# Patient Record
Sex: MALE | Race: WHITE | Employment: FULL TIME | URBAN - METROPOLITAN AREA
[De-identification: names, ages, dates, MRNs, and addresses within clinical notes are randomized per-mention and may not be internally consistent; named-entity substitution may affect disease eponyms.]

---

## 2020-07-08 ENCOUNTER — OFFICE VISIT (OUTPATIENT)
Dept: PRIMARY CARE CLINIC | Age: 35
End: 2020-07-08

## 2020-07-08 ENCOUNTER — HOSPITAL ENCOUNTER (OUTPATIENT)
Age: 35
Setting detail: SPECIMEN
Discharge: HOME | End: 2020-07-08
Payer: COMMERCIAL

## 2020-07-08 VITALS — OXYGEN SATURATION: 96 % | TEMPERATURE: 98.1 F | HEART RATE: 119 BPM

## 2020-07-08 PROCEDURE — U0002 COVID-19 LAB TEST NON-CDC: HCPCS

## 2020-07-08 PROCEDURE — 99213 OFFICE O/P EST LOW 20 MIN: CPT | Performed by: FAMILY MEDICINE

## 2020-07-08 NOTE — PATIENT INSTRUCTIONS
Your COVID 19 will take 3-4 days for the results come back. The [address] will notify you if your test result are POSITIVE. We ask that you make a Mychart page and view your test results. The office will notify if results are Negative. You will need to Self quarantine until you know your results  Increase fluids rest  Saline nasal spray as directed  Warm salt gargles for throat discomfort  Monitor temperature twice a day  Tylenol for fevers and/or discomfort. If symptoms are worse -Go to the ER. Follow up with your primary doctor    To Whom it May Concern:    Serene Orr has been tested for COVID. They may not return to work until their lab test results back and they been fever free for 3 days.   If test is positive they must stay home for 2 weeks or until they test negative

## 2020-07-08 NOTE — PROGRESS NOTES
7/8/20  Chau Meek  1985    FLU/COVID-19 CLINIC EVALUATION    HPI SYMPTOMS: 2 days of cough with chest congestion, nasal congestion, ST and loss of taste. No GI issues. No known exposures. Getting a little worse today. Employer: [employer]    [] Fevers  [] Chills  [x] Cough  [] Coughing up blood  [x] Chest Congestion  [x] Nasal Congestion  [] Feeling short of breath  [] Sometimes  [] Frequently  [] All the time  [] Chest pain  [] Headaches  []Tolerable  [] Severe  [x] Sore throat  [] Muscle aches  [] Nausea  [] Vomiting  []Unable to keep fluids down  [] Diarrhea  []Severe    [x] OTHER SYMPTOMS: Loss of Taste      Symptom Duration:   [] 1  [x] 2   [] 3   [] 4    [] 5   [] 6   [] 7   [] 8   [] 9   [] 10   [] 11   [] 12   [] 13   [] 14   [] Longer than 14 days    Symptom course:   [] Worsening     [] Stable     [] Improving    RISK FACTORS:    [] Pregnant or possibly pregnant  [] Age over 61  [] Diabetes  [] Heart disease  [] Asthma  [] COPD/Other chronic lung diseases  [] Active Cancer  [] On Chemotherapy  [] Taking oral steroids  [] History Lymphoma/Leukemia  [] Close contact with a lab confirmed COVID-19 patient within 14 days of symptom onset  [] History of travel from affected geographical areas within 14 days of symptom onset       VITALS:  Vitals:    07/08/20 1125   Pulse: 119   Temp: 98.1 °F (36.7 °C)   TempSrc: Temporal   SpO2: 96%      Nares with purplish discoloration, throat clear. Heart is RRR without murmur and lungs are CTA.     TESTS:    POCT FLU:  [] Positive     []Negative    ASSESSMENT:    [] Flu  [x] Possible COVID-19 likely URI  [] Strep    PLAN:    [x] Discharge home with written instructions for:  [x] Flu management  [x] Possible COVID-19 infection with self-quarantine and management of symptoms  [x] Follow-up with primary care physician or emergency department if worsens  [] Evaluation per physician/nurse practitioner in clinic  [] Sent to ER     Your COVID 19 will take 3-4 days for the results come back. The [address] will notify you if your test result are POSITIVE. We ask that you make a Mychart page and view your test results. The office will notify if results are Negative. You will need to Self quarantine until you know your results  Increase fluids rest  Saline nasal spray as directed  Warm salt gargles for throat discomfort  Monitor temperature twice a day  Tylenol for fevers and/or discomfort. If symptoms are worse -Go to the ER. Follow up with your primary doctor    To Whom it May Concern:    Gretchen Marks has been tested for COVID. They may not return to work until their lab test results back and they been fever free for 3 days. If test is positive they must stay home for 2 weeks or until they test negative     An  electronic signature was used to authenticate this note.      --Michelle Castillo MD on 7/8/2020 at 11:45 AM

## 2020-07-15 LAB
SARS-COV-2: NOT DETECTED
SOURCE: NORMAL

## 2021-02-26 ENCOUNTER — HOSPITAL ENCOUNTER (OUTPATIENT)
Dept: SLEEP CENTER | Age: 36
Discharge: HOME | End: 2021-02-26
Payer: COMMERCIAL

## 2021-02-26 VITALS — HEIGHT: 69 IN | WEIGHT: 195 LBS | BODY MASS INDEX: 28.88 KG/M2

## 2021-02-26 DIAGNOSIS — G47.10 HYPERSOMNOLENCE: Primary | ICD-10-CM

## 2021-02-26 DIAGNOSIS — R06.83 SNORING: ICD-10-CM

## 2021-02-26 PROCEDURE — 99211 OFF/OP EST MAY X REQ PHY/QHP: CPT

## 2021-02-26 ASSESSMENT — SLEEP AND FATIGUE QUESTIONNAIRES
HOW LIKELY ARE YOU TO NOD OFF OR FALL ASLEEP WHILE SITTING AND TALKING TO SOMEONE: 0
HOW LIKELY ARE YOU TO NOD OFF OR FALL ASLEEP WHILE SITTING AND READING: 0
HOW LIKELY ARE YOU TO NOD OFF OR FALL ASLEEP WHILE WATCHING TV: 0
HOW LIKELY ARE YOU TO NOD OFF OR FALL ASLEEP IN A CAR, WHILE STOPPED FOR A FEW MINUTES IN TRAFFIC: 0
HOW LIKELY ARE YOU TO NOD OFF OR FALL ASLEEP WHILE SITTING QUIETLY AFTER LUNCH WITHOUT ALCOHOL: 0

## 2021-02-26 NOTE — PROGRESS NOTES
García Keen MD, Seymour Velazquez MD, Michelle Bailey MD, [facility]      [address]   [address], [address] [facility]   [address]: [phone]  F: [phone]     Subjective:     Patient ID: Zaheer Carmona is a 28 y.o. male, referred to the sleep center for consultation with a sleep specialist.     Reason for Consultation/Chief Complaint:   Chief Complaint   Patient presents with    Snoring    Daytime Sleepiness       Referring physician:  Emanuel Colunga PA-C    Symptoms:   [x]  Snoring                                                                    []  Dry Mouth  []  Choking                                                                   []  Morning Headaches  [x]  Gasping for Air                                                        [x]  Trouble Falling asleep  [x]  Tired during the daytime                                         []  Trouble Staying Asleep  [x]  Tired when you wake up                                         []  Weight Gain in Last 5 Years  [x]  Wake up frequently at night                                    []  Weight Loss in Last 5 Years  []  Shortness Of Breath                                               []  Shift Worker  []  Coughing                                                                []  Smoker (Previous or Current)  []  Chest Pain                                                              []  Anxiety  []  Trouble keeping your legs still at night                   [x]  Depression  []  Kicking your legs in your sleep                               []  Insomnia            []  Other:     Significant Co-morbidities:  []  Congestive Heart Failure     []  COPD         []  Stroke (Past 30 Days)      []  Supplemental Oxygen Usage       []  Cognitive Impairment      []  Neuromuscular Problems  []  Epilepsy/Neurological Disorders         Duration of Sleep Problems:    History:    Social History     Socioeconomic History    Marital status:      Spouse name: Not on file    Number of children: Not on file    Years of education: Not on file    Highest education level: Not on file   Occupational History    Not on file   Social Needs    Financial resource strain: Not on file    Food insecurity     Worry: Not on file     Inability: Not on file    Transportation needs     Medical: Not on file     Non-medical: Not on file   Tobacco Use    Smoking status: Never Smoker    Smokeless tobacco: Never Used   Substance and Sexual Activity    Alcohol use: Yes     Comment: socially    Drug use: No    Sexual activity: Not on file   Lifestyle    Physical activity     Days per week: Not on file     Minutes per session: Not on file    Stress: Not on file   Relationships    Social connections     Talks on phone: Not on file     Gets together: Not on file     Attends Gnosticist service: Not on file     Active member of club or organization: Not on file     Attends meetings of clubs or organizations: Not on file     Relationship status: Not on file    Intimate partner violence     Fear of current or ex partner: Not on file     Emotionally abused: Not on file     Physically abused: Not on file     Forced sexual activity: Not on file   Other Topics Concern    Not on file   Social History Narrative    Not on file       Prior to Admission medications    Medication Sig Start Date End Date Taking?  Authorizing Provider   venlafaxine (EFFEXOR XR) 75 MG extended release capsule Take 1 capsule by mouth daily 10/19/16  Yes Jordy Segura PA-C   pantoprazole (PROTONIX) 40 MG tablet Take 1 tablet by mouth daily 10/19/16  Yes Jordy Segura PA-C       Allergies as of 02/26/2021 - Review Complete 02/26/2021   Allergen Reaction Noted    Penicillins  01/11/2012       Patient Active Problem List   Diagnosis    Lateral epicondylitis    Lateral epicondylitis (tennis elbow)    Snoring    Hypersomnolence Past Medical History:   Diagnosis Date    Chronic back pain     Depression     GERD (gastroesophageal reflux disease)     Vitamin D deficiency 2012       Past Surgical History:   Procedure Laterality Date    ELBOW SURGERY Left 03/2015    Dr Rosio Neely procedure    HERNIA REPAIR  2002    right       Family History   Adopted: Yes   Problem Relation Age of Onset    Diabetes Mother     Diabetes Father          Objective:     Vitals:    02/26/21 1442   Weight: 195 lb (88.5 kg)   Height: 5' 9\" (1.753 m)     Body mass index is 28.8 kg/m². Neck -    Inches  [place] - Total score: 0    REVIEW OF SYSTEMS:  General: No distress. Constitutional: Negative for fever, no distress. HENT: Negative for sore throat, external appearance of ears and nose normal.   Eyes: Negative for redness. Respiratory: Negative for dyspnea, cough. Cardiovascular: Negative for chest pain. Gastrointestinal: Negative for vomiting, diarrhea. Musculoskeletal: Negative for arthralgias. Skin: Negative for rash. Neurological: Negative for syncope. Mallampati Airway Classification:   []1 []2 [x]3 []4          Previous CPAP Usage:    [x]  Patient has never worn PAP. []  Patient has worn PAP previously but discontinued use. []  Current PAP user. Assessment:      Diagnosis:   EDS Snoring R/O LATONIA. Plan:     1.HST.  2.Sleep hygiene. 3.RTO 1 mthJose Zarate is a 28 y.o. male being evaluated by a Virtual Visit (video visit) encounter to address concerns as mentioned above. A caregiver was present when appropriate. Due to this being a TeleHealth encounter (During OXJ-11 public health emergency), evaluation of the following organ systems was limited: Vitals/Constitutional/EENT/Resp/CV/GI//MS/Neuro/Skin/Heme-Lymph-Imm.   Pursuant to the emergency declaration under the [facility], [facility] authority and the [employer], this Virtual Visit was conducted with patient's (and/or legal guardian's) consent, to reduce the patient's risk of exposure to COVID-19 and provide necessary medical care. The patient (and/or legal guardian) has also been advised to contact this office for worsening conditions or problems, and seek emergency medical treatment and/or call 911 if deemed necessary. Patient identification was verified at the start of the visit: Yes    Services were provided through a video synchronous discussion virtually to substitute for in-person clinic visit. Patient and provider were located at their individual homes. --May Oneill MD on 2/26/2021 at 2:49 PM    An electronic signature was used to authenticate this note. No orders of the defined types were placed in this encounter.          Electronically signed by May Oneill MD on 2/26/2021 at 2:49 PM

## 2021-03-08 ENCOUNTER — HOSPITAL ENCOUNTER (OUTPATIENT)
Dept: SLEEP CENTER | Age: 36
Discharge: HOME | End: 2021-03-08
Payer: COMMERCIAL

## 2021-03-08 PROCEDURE — G0398 HOME SLEEP TEST/TYPE 2 PORTA: HCPCS

## 2021-03-08 NOTE — PROGRESS NOTES
Janet Mirza  1985  arrived at Sleep Center on 3/8/2021 for set up and instruction of home sleep study with the [facility] unit. he was instructed on proper set-up and operation of HST unit. Written instructions with set up diagram given for reference and reinforcement of verbal instruction and demonstration. he was able to return demonstration appropriately. No home environment, vision, dexterity, comprehension concerns with this patient based on completed forms and patient interactions. Patient will return unit after 2 nights as instructed.     Electronically signed by Aarti uY on 3/8/2021 at 11:40 AM

## 2021-03-12 ENCOUNTER — HOSPITAL ENCOUNTER (OUTPATIENT)
Dept: SLEEP CENTER | Age: 36
Discharge: HOME | End: 2021-03-12
Payer: COMMERCIAL

## 2021-03-12 DIAGNOSIS — G47.33 OSA (OBSTRUCTIVE SLEEP APNEA): Primary | ICD-10-CM

## 2021-03-12 PROCEDURE — 9990000010 HC NO CHARGE VISIT

## 2021-03-12 NOTE — PROGRESS NOTES
Arely Bird MD, Parisa Campos MD, Loli Leung MD, [facility]      [address], [address] [facility]   PH: [phone]  F: [phone]     Subjective:     Patient ID: Leonel Schaffer is a 28 y.o. male, following up today with the sleep center. Reason for Follow Up/Chief Complaint:   Chief Complaint   Patient presents with    Daytime Sleepiness    Snoring    2 Week Follow-Up       Results:HST:Mild LATONIA.     History:     Social History     Socioeconomic History    Marital status:      Spouse name: Not on file    Number of children: Not on file    Years of education: Not on file    Highest education level: Not on file   Occupational History    Not on file   Social Needs    Financial resource strain: Not on file    Food insecurity     Worry: Not on file     Inability: Not on file    Transportation needs     Medical: Not on file     Non-medical: Not on file   Tobacco Use    Smoking status: Never Smoker    Smokeless tobacco: Never Used   Substance and Sexual Activity    Alcohol use: Yes     Comment: socially    Drug use: No    Sexual activity: Not on file   Lifestyle    Physical activity     Days per week: Not on file     Minutes per session: Not on file    Stress: Not on file   Relationships    Social connections     Talks on phone: Not on file     Gets together: Not on file     Attends Jewish service: Not on file     Active member of club or organization: Not on file     Attends meetings of clubs or organizations: Not on file     Relationship status: Not on file    Intimate partner violence     Fear of current or ex partner: Not on file     Emotionally abused: Not on file     Physically abused: Not on file     Forced sexual activity: Not on file   Other Topics Concern    Not on file   Social History Narrative    Not on file       Prior to Admission medications    Medication Sig Start Date End Date Taking? Authorizing Provider   venlafaxine (EFFEXOR XR) 75 MG extended release capsule Take 1 capsule by mouth daily 10/19/16  Yes Jordy Segura PA-C   pantoprazole (PROTONIX) 40 MG tablet Take 1 tablet by mouth daily 10/19/16  Yes Jordy Segura PA-C       Allergies as of 03/12/2021 - Review Complete 03/12/2021   Allergen Reaction Noted    Penicillins  01/11/2012       Patient Active Problem List   Diagnosis    Lateral epicondylitis    Lateral epicondylitis (tennis elbow)    Snoring    Hypersomnolence    LATONIA (obstructive sleep apnea)       Past Medical History:   Diagnosis Date    Chronic back pain     Depression     GERD (gastroesophageal reflux disease)     Vitamin D deficiency 2012       Past Surgical History:   Procedure Laterality Date    ELBOW SURGERY Left 03/2015    Dr Trini Blackmon procedure    HERNIA REPAIR  2002    right       Family History   Adopted: Yes   Problem Relation Age of Onset    Diabetes Mother     Diabetes Father          Objective: There were no vitals filed for this visit. Neck -    Inches  [place] -        Assessment:      Diagnosis:  Mild LATONIA. Plan: 1. Arrange Apap 5/20. 2.Sleep hygiene. 3.RTO 2 mths. Tran Chan is a 28 y.o. male being evaluated by a Virtual Visit (video visit) encounter to address concerns as mentioned above. A caregiver was present when appropriate. Due to this being a TeleHealth encounter (During [facility]- public health emergency), evaluation of the following organ systems was limited: Vitals/Constitutional/EENT/Resp/CV/GI//MS/Neuro/Skin/Heme-Lymph-Imm. Pursuant to the emergency declaration under the [address] [place] [address], [address] [place] [address] authority and the [employer] and [employer] Act, this Virtual Visit was conducted with patient's (and/or legal guardian's) consent, to reduce the patient's risk of exposure to COVID-19 and provide necessary medical care.   The patient (and/or legal guardian) has also been advised to contact this office for worsening conditions or problems, and seek emergency medical treatment and/or call 911 if deemed necessary. Patient identification was verified at the start of the visit: Yes    Services were provided through a video synchronous discussion virtually to substitute for in-person clinic visit. Patient and provider were located at their individual homes. --Miguelina Busch MD on 3/12/2021 at 3:04 PM    An electronic signature was used to authenticate this note. No orders of the defined types were placed in this encounter.          Electronically signed by Miguelina Busch MD on 3/12/2021 at 3:04 PM

## 2021-03-13 NOTE — PROGRESS NOTES
Results for the most recent sleep study on Raquel Earing  1985 are finalized and available. Please see media tab.     Electronically signed by Kristen Ramos on 3/13/2021 at 12:28 AM

## 2022-03-03 ENCOUNTER — HOSPITAL ENCOUNTER (OUTPATIENT)
Age: 37
Setting detail: OBSERVATION
Discharge: HOME | End: 2022-03-04
Attending: EMERGENCY MEDICINE | Admitting: EMERGENCY MEDICINE
Payer: COMMERCIAL

## 2022-03-03 ENCOUNTER — ANESTHESIA EVENT (OUTPATIENT)
Dept: OPERATING ROOM | Age: 37
End: 2022-03-03
Payer: COMMERCIAL

## 2022-03-03 ENCOUNTER — APPOINTMENT (OUTPATIENT)
Dept: CT IMAGING | Age: 37
End: 2022-03-03
Payer: COMMERCIAL

## 2022-03-03 ENCOUNTER — ANESTHESIA (OUTPATIENT)
Dept: OPERATING ROOM | Age: 37
End: 2022-03-03
Payer: COMMERCIAL

## 2022-03-03 VITALS
SYSTOLIC BLOOD PRESSURE: 81 MMHG | TEMPERATURE: 99.4 F | OXYGEN SATURATION: 100 % | DIASTOLIC BLOOD PRESSURE: 45 MMHG | RESPIRATION RATE: 7 BRPM

## 2022-03-03 DIAGNOSIS — R07.89 CHEST WALL PAIN FOLLOWING SURGERY: ICD-10-CM

## 2022-03-03 DIAGNOSIS — G89.18 CHEST WALL PAIN FOLLOWING SURGERY: ICD-10-CM

## 2022-03-03 DIAGNOSIS — K35.80 ACUTE APPENDICITIS, UNSPECIFIED ACUTE APPENDICITIS TYPE: ICD-10-CM

## 2022-03-03 DIAGNOSIS — D72.829 LEUKOCYTOSIS, UNSPECIFIED TYPE: ICD-10-CM

## 2022-03-03 DIAGNOSIS — R10.9 ABDOMINAL PAIN, UNSPECIFIED ABDOMINAL LOCATION: Primary | ICD-10-CM

## 2022-03-03 DIAGNOSIS — R11.2 NAUSEA AND VOMITING, INTRACTABILITY OF VOMITING NOT SPECIFIED, UNSPECIFIED VOMITING TYPE: ICD-10-CM

## 2022-03-03 PROBLEM — K35.30 ACUTE APPENDICITIS WITH LOCALIZED PERITONITIS, WITHOUT PERFORATION, ABSCESS, OR GANGRENE: Status: ACTIVE | Noted: 2022-03-03

## 2022-03-03 LAB
ALBUMIN SERPL-MCNC: 4.7 GM/DL (ref 3.4–5)
ALP BLD-CCNC: 104 IU/L (ref 40–129)
ALT SERPL-CCNC: 42 U/L (ref 10–40)
AMORPHOUS: ABNORMAL /HPF
ANION GAP SERPL CALCULATED.3IONS-SCNC: 13 MMOL/L (ref 4–16)
AST SERPL-CCNC: 28 IU/L (ref 15–37)
BACTERIA: NEGATIVE /HPF
BASOPHILS ABSOLUTE: 0 K/CU MM
BASOPHILS RELATIVE PERCENT: 0.2 % (ref 0–1)
BILIRUB SERPL-MCNC: 0.5 MG/DL (ref 0–1)
BILIRUBIN URINE: NEGATIVE MG/DL
BLOOD, URINE: NEGATIVE
BUN BLDV-MCNC: 11 MG/DL (ref 6–23)
CALCIUM SERPL-MCNC: 9.6 MG/DL (ref 8.3–10.6)
CHLORIDE BLD-SCNC: 95 MMOL/L (ref 99–110)
CLARITY: CLEAR
CO2: 25 MMOL/L (ref 21–32)
COLOR: YELLOW
CREAT SERPL-MCNC: 0.8 MG/DL (ref 0.9–1.3)
DIFFERENTIAL TYPE: ABNORMAL
EOSINOPHILS ABSOLUTE: 0 K/CU MM
EOSINOPHILS RELATIVE PERCENT: 0 % (ref 0–3)
GFR AFRICAN AMERICAN: >60 ML/MIN/1.73M2
GFR NON-AFRICAN AMERICAN: >60 ML/MIN/1.73M2
GLUCOSE BLD-MCNC: 157 MG/DL (ref 70–99)
GLUCOSE, URINE: NEGATIVE MG/DL
HCT VFR BLD CALC: 43.6 % (ref 42–52)
HEMOGLOBIN: 14.7 GM/DL (ref 13.5–18)
IMMATURE NEUTROPHIL %: 0.4 % (ref 0–0.43)
KETONES, URINE: NEGATIVE MG/DL
LEUKOCYTE ESTERASE, URINE: NEGATIVE
LIPASE: 15 IU/L (ref 13–60)
LYMPHOCYTES ABSOLUTE: 0.8 K/CU MM
LYMPHOCYTES RELATIVE PERCENT: 4 % (ref 24–44)
MCH RBC QN AUTO: 28.6 PG (ref 27–31)
MCHC RBC AUTO-ENTMCNC: 33.7 % (ref 32–36)
MCV RBC AUTO: 84.8 FL (ref 78–100)
MONOCYTES ABSOLUTE: 1.9 K/CU MM
MONOCYTES RELATIVE PERCENT: 9.1 % (ref 0–4)
MUCUS: ABNORMAL HPF
NITRITE URINE, QUANTITATIVE: NEGATIVE
NUCLEATED RBC %: 0 %
PDW BLD-RTO: 12.8 % (ref 11.7–14.9)
PH, URINE: 7.5 (ref 5–8)
PLATELET # BLD: 459 K/CU MM (ref 140–440)
PMV BLD AUTO: 8.7 FL (ref 7.5–11.1)
POTASSIUM SERPL-SCNC: 3.6 MMOL/L (ref 3.5–5.1)
PROTEIN UA: NEGATIVE MG/DL
RBC # BLD: 5.14 M/CU MM (ref 4.6–6.2)
RBC URINE: ABNORMAL /HPF (ref 0–3)
SARS-COV-2, NAAT: NOT DETECTED
SEGMENTED NEUTROPHILS ABSOLUTE COUNT: 17.9 K/CU MM
SEGMENTED NEUTROPHILS RELATIVE PERCENT: 86.3 % (ref 36–66)
SODIUM BLD-SCNC: 133 MMOL/L (ref 135–145)
SOURCE: NORMAL
SPECIFIC GRAVITY UA: 1.02 (ref 1–1.03)
TOTAL IMMATURE NEUTOROPHIL: 0.09 K/CU MM
TOTAL NUCLEATED RBC: 0 K/CU MM
TOTAL PROTEIN: 7.8 GM/DL (ref 6.4–8.2)
UROBILINOGEN, URINE: 0.2 MG/DL (ref 0.2–1)
WBC # BLD: 20.8 K/CU MM (ref 4–10.5)
WBC UA: <1 /HPF (ref 0–2)

## 2022-03-03 PROCEDURE — 96375 TX/PRO/DX INJ NEW DRUG ADDON: CPT

## 2022-03-03 PROCEDURE — 2500000003 HC RX 250 WO HCPCS: Performed by: SURGERY

## 2022-03-03 PROCEDURE — 96374 THER/PROPH/DIAG INJ IV PUSH: CPT

## 2022-03-03 PROCEDURE — 96372 THER/PROPH/DIAG INJ SC/IM: CPT

## 2022-03-03 PROCEDURE — 2500000003 HC RX 250 WO HCPCS

## 2022-03-03 PROCEDURE — 3600000004 HC SURGERY LEVEL 4 BASE: Performed by: SURGERY

## 2022-03-03 PROCEDURE — 2580000003 HC RX 258: Performed by: EMERGENCY MEDICINE

## 2022-03-03 PROCEDURE — 87635 SARS-COV-2 COVID-19 AMP PRB: CPT

## 2022-03-03 PROCEDURE — 3700000000 HC ANESTHESIA ATTENDED CARE: Performed by: SURGERY

## 2022-03-03 PROCEDURE — 96367 TX/PROPH/DG ADDL SEQ IV INF: CPT

## 2022-03-03 PROCEDURE — 3700000001 HC ADD 15 MINUTES (ANESTHESIA): Performed by: SURGERY

## 2022-03-03 PROCEDURE — 7100000000 HC PACU RECOVERY - FIRST 15 MIN: Performed by: SURGERY

## 2022-03-03 PROCEDURE — 2720000010 HC SURG SUPPLY STERILE: Performed by: SURGERY

## 2022-03-03 PROCEDURE — 96361 HYDRATE IV INFUSION ADD-ON: CPT

## 2022-03-03 PROCEDURE — 74177 CT ABD & PELVIS W/CONTRAST: CPT

## 2022-03-03 PROCEDURE — 3600000014 HC SURGERY LEVEL 4 ADDTL 15MIN: Performed by: SURGERY

## 2022-03-03 PROCEDURE — 96376 TX/PRO/DX INJ SAME DRUG ADON: CPT

## 2022-03-03 PROCEDURE — 6360000002 HC RX W HCPCS: Performed by: SURGERY

## 2022-03-03 PROCEDURE — 2500000003 HC RX 250 WO HCPCS: Performed by: EMERGENCY MEDICINE

## 2022-03-03 PROCEDURE — 2580000003 HC RX 258: Performed by: SURGERY

## 2022-03-03 PROCEDURE — 6360000004 HC RX CONTRAST MEDICATION: Performed by: EMERGENCY MEDICINE

## 2022-03-03 PROCEDURE — 88304 TISSUE EXAM BY PATHOLOGIST: CPT

## 2022-03-03 PROCEDURE — 81001 URINALYSIS AUTO W/SCOPE: CPT

## 2022-03-03 PROCEDURE — 2709999900 HC NON-CHARGEABLE SUPPLY: Performed by: SURGERY

## 2022-03-03 PROCEDURE — 99283 EMERGENCY DEPT VISIT LOW MDM: CPT

## 2022-03-03 PROCEDURE — 6360000002 HC RX W HCPCS: Performed by: EMERGENCY MEDICINE

## 2022-03-03 PROCEDURE — 83690 ASSAY OF LIPASE: CPT

## 2022-03-03 PROCEDURE — 96365 THER/PROPH/DIAG IV INF INIT: CPT

## 2022-03-03 PROCEDURE — 7100000001 HC PACU RECOVERY - ADDTL 15 MIN: Performed by: SURGERY

## 2022-03-03 PROCEDURE — 80053 COMPREHEN METABOLIC PANEL: CPT

## 2022-03-03 PROCEDURE — 85025 COMPLETE CBC W/AUTO DIFF WBC: CPT

## 2022-03-03 PROCEDURE — 87086 URINE CULTURE/COLONY COUNT: CPT

## 2022-03-03 PROCEDURE — 6370000000 HC RX 637 (ALT 250 FOR IP): Performed by: SURGERY

## 2022-03-03 PROCEDURE — 2580000003 HC RX 258

## 2022-03-03 PROCEDURE — G0378 HOSPITAL OBSERVATION PER HR: HCPCS

## 2022-03-03 PROCEDURE — 6360000002 HC RX W HCPCS

## 2022-03-03 RX ORDER — OXYCODONE HYDROCHLORIDE 5 MG/1
5 TABLET ORAL EVERY 4 HOURS PRN
Status: DISCONTINUED | OUTPATIENT
Start: 2022-03-03 | End: 2022-03-04 | Stop reason: HOSPADM

## 2022-03-03 RX ORDER — GLYCOPYRROLATE 1 MG/5 ML
SYRINGE (ML) INTRAVENOUS PRN
Status: DISCONTINUED | OUTPATIENT
Start: 2022-03-03 | End: 2022-03-03 | Stop reason: SDUPTHER

## 2022-03-03 RX ORDER — FENTANYL CITRATE 50 UG/ML
25 INJECTION, SOLUTION INTRAMUSCULAR; INTRAVENOUS EVERY 5 MIN PRN
Status: DISCONTINUED | OUTPATIENT
Start: 2022-03-03 | End: 2022-03-03 | Stop reason: HOSPADM

## 2022-03-03 RX ORDER — LIDOCAINE HYDROCHLORIDE 20 MG/ML
INJECTION, SOLUTION EPIDURAL; INFILTRATION; INTRACAUDAL; PERINEURAL PRN
Status: DISCONTINUED | OUTPATIENT
Start: 2022-03-03 | End: 2022-03-03 | Stop reason: SDUPTHER

## 2022-03-03 RX ORDER — SODIUM CHLORIDE 0.9 % (FLUSH) 0.9 %
5-40 SYRINGE (ML) INJECTION EVERY 12 HOURS SCHEDULED
Status: DISCONTINUED | OUTPATIENT
Start: 2022-03-03 | End: 2022-03-04 | Stop reason: HOSPADM

## 2022-03-03 RX ORDER — SODIUM CHLORIDE 9 MG/ML
INJECTION, SOLUTION INTRAVENOUS CONTINUOUS
Status: DISCONTINUED | OUTPATIENT
Start: 2022-03-03 | End: 2022-03-04

## 2022-03-03 RX ORDER — SODIUM CHLORIDE 0.9 % (FLUSH) 0.9 %
5-40 SYRINGE (ML) INJECTION PRN
Status: DISCONTINUED | OUTPATIENT
Start: 2022-03-03 | End: 2022-03-03 | Stop reason: HOSPADM

## 2022-03-03 RX ORDER — NEOSTIGMINE METHYLSULFATE 5 MG/5 ML
SYRINGE (ML) INTRAVENOUS PRN
Status: DISCONTINUED | OUTPATIENT
Start: 2022-03-03 | End: 2022-03-03 | Stop reason: SDUPTHER

## 2022-03-03 RX ORDER — DEXAMETHASONE SODIUM PHOSPHATE 4 MG/ML
INJECTION, SOLUTION INTRA-ARTICULAR; INTRALESIONAL; INTRAMUSCULAR; INTRAVENOUS; SOFT TISSUE PRN
Status: DISCONTINUED | OUTPATIENT
Start: 2022-03-03 | End: 2022-03-03 | Stop reason: SDUPTHER

## 2022-03-03 RX ORDER — ACETAMINOPHEN 325 MG/1
650 TABLET ORAL EVERY 4 HOURS PRN
Status: DISCONTINUED | OUTPATIENT
Start: 2022-03-03 | End: 2022-03-04 | Stop reason: HOSPADM

## 2022-03-03 RX ORDER — SODIUM CHLORIDE 0.9 % (FLUSH) 0.9 %
5-40 SYRINGE (ML) INJECTION PRN
Status: DISCONTINUED | OUTPATIENT
Start: 2022-03-03 | End: 2022-03-04 | Stop reason: HOSPADM

## 2022-03-03 RX ORDER — SODIUM CHLORIDE 9 MG/ML
25 INJECTION, SOLUTION INTRAVENOUS PRN
Status: DISCONTINUED | OUTPATIENT
Start: 2022-03-03 | End: 2022-03-04 | Stop reason: HOSPADM

## 2022-03-03 RX ORDER — SUCCINYLCHOLINE/SOD CL,ISO/PF 100 MG/5ML
SYRINGE (ML) INTRAVENOUS PRN
Status: DISCONTINUED | OUTPATIENT
Start: 2022-03-03 | End: 2022-03-03 | Stop reason: SDUPTHER

## 2022-03-03 RX ORDER — MIDAZOLAM HYDROCHLORIDE 1 MG/ML
INJECTION INTRAMUSCULAR; INTRAVENOUS PRN
Status: DISCONTINUED | OUTPATIENT
Start: 2022-03-03 | End: 2022-03-03 | Stop reason: SDUPTHER

## 2022-03-03 RX ORDER — ONDANSETRON 2 MG/ML
4 INJECTION INTRAMUSCULAR; INTRAVENOUS EVERY 30 MIN PRN
Status: DISCONTINUED | OUTPATIENT
Start: 2022-03-03 | End: 2022-03-03

## 2022-03-03 RX ORDER — ONDANSETRON 2 MG/ML
INJECTION INTRAMUSCULAR; INTRAVENOUS PRN
Status: DISCONTINUED | OUTPATIENT
Start: 2022-03-03 | End: 2022-03-03 | Stop reason: SDUPTHER

## 2022-03-03 RX ORDER — BUPIVACAINE HYDROCHLORIDE 5 MG/ML
INJECTION, SOLUTION EPIDURAL; INTRACAUDAL
Status: COMPLETED | OUTPATIENT
Start: 2022-03-03 | End: 2022-03-03

## 2022-03-03 RX ORDER — FENTANYL CITRATE 50 UG/ML
INJECTION, SOLUTION INTRAMUSCULAR; INTRAVENOUS PRN
Status: DISCONTINUED | OUTPATIENT
Start: 2022-03-03 | End: 2022-03-03 | Stop reason: SDUPTHER

## 2022-03-03 RX ORDER — ROCURONIUM BROMIDE 10 MG/ML
INJECTION, SOLUTION INTRAVENOUS PRN
Status: DISCONTINUED | OUTPATIENT
Start: 2022-03-03 | End: 2022-03-03 | Stop reason: SDUPTHER

## 2022-03-03 RX ORDER — ONDANSETRON 4 MG/1
4 TABLET, ORALLY DISINTEGRATING ORAL EVERY 8 HOURS PRN
Status: DISCONTINUED | OUTPATIENT
Start: 2022-03-03 | End: 2022-03-04 | Stop reason: HOSPADM

## 2022-03-03 RX ORDER — SODIUM CHLORIDE 9 MG/ML
25 INJECTION, SOLUTION INTRAVENOUS PRN
Status: DISCONTINUED | OUTPATIENT
Start: 2022-03-03 | End: 2022-03-03 | Stop reason: HOSPADM

## 2022-03-03 RX ORDER — DICYCLOMINE HYDROCHLORIDE 10 MG/ML
20 INJECTION INTRAMUSCULAR ONCE
Status: COMPLETED | OUTPATIENT
Start: 2022-03-03 | End: 2022-03-03

## 2022-03-03 RX ORDER — MORPHINE SULFATE 2 MG/ML
4 INJECTION, SOLUTION INTRAMUSCULAR; INTRAVENOUS EVERY 30 MIN PRN
Status: DISCONTINUED | OUTPATIENT
Start: 2022-03-03 | End: 2022-03-03

## 2022-03-03 RX ORDER — CIPROFLOXACIN 2 MG/ML
400 INJECTION, SOLUTION INTRAVENOUS EVERY 12 HOURS
Status: DISCONTINUED | OUTPATIENT
Start: 2022-03-03 | End: 2022-03-04 | Stop reason: HOSPADM

## 2022-03-03 RX ORDER — ONDANSETRON 2 MG/ML
4 INJECTION INTRAMUSCULAR; INTRAVENOUS
Status: DISCONTINUED | OUTPATIENT
Start: 2022-03-03 | End: 2022-03-03 | Stop reason: HOSPADM

## 2022-03-03 RX ORDER — PROPOFOL 10 MG/ML
INJECTION, EMULSION INTRAVENOUS PRN
Status: DISCONTINUED | OUTPATIENT
Start: 2022-03-03 | End: 2022-03-03 | Stop reason: SDUPTHER

## 2022-03-03 RX ORDER — FAMOTIDINE 20 MG/1
20 TABLET, FILM COATED ORAL 2 TIMES DAILY
Status: DISCONTINUED | OUTPATIENT
Start: 2022-03-03 | End: 2022-03-04 | Stop reason: HOSPADM

## 2022-03-03 RX ORDER — FENTANYL CITRATE 50 UG/ML
50 INJECTION, SOLUTION INTRAMUSCULAR; INTRAVENOUS EVERY 5 MIN PRN
Status: DISCONTINUED | OUTPATIENT
Start: 2022-03-03 | End: 2022-03-03 | Stop reason: HOSPADM

## 2022-03-03 RX ORDER — 0.9 % SODIUM CHLORIDE 0.9 %
1000 INTRAVENOUS SOLUTION INTRAVENOUS ONCE
Status: COMPLETED | OUTPATIENT
Start: 2022-03-03 | End: 2022-03-03

## 2022-03-03 RX ORDER — OXYCODONE HYDROCHLORIDE 10 MG/1
10 TABLET ORAL EVERY 4 HOURS PRN
Status: DISCONTINUED | OUTPATIENT
Start: 2022-03-03 | End: 2022-03-04 | Stop reason: HOSPADM

## 2022-03-03 RX ORDER — SODIUM CHLORIDE, SODIUM LACTATE, POTASSIUM CHLORIDE, CALCIUM CHLORIDE 600; 310; 30; 20 MG/100ML; MG/100ML; MG/100ML; MG/100ML
INJECTION, SOLUTION INTRAVENOUS CONTINUOUS PRN
Status: DISCONTINUED | OUTPATIENT
Start: 2022-03-03 | End: 2022-03-03 | Stop reason: SDUPTHER

## 2022-03-03 RX ORDER — SODIUM CHLORIDE 0.9 % (FLUSH) 0.9 %
5-40 SYRINGE (ML) INJECTION EVERY 12 HOURS SCHEDULED
Status: DISCONTINUED | OUTPATIENT
Start: 2022-03-03 | End: 2022-03-03 | Stop reason: HOSPADM

## 2022-03-03 RX ORDER — ONDANSETRON 2 MG/ML
4 INJECTION INTRAMUSCULAR; INTRAVENOUS EVERY 6 HOURS PRN
Status: DISCONTINUED | OUTPATIENT
Start: 2022-03-03 | End: 2022-03-04 | Stop reason: HOSPADM

## 2022-03-03 RX ADMIN — MIDAZOLAM 2 MG: 1 INJECTION INTRAMUSCULAR; INTRAVENOUS at 16:03

## 2022-03-03 RX ADMIN — Medication 100 MG: at 16:10

## 2022-03-03 RX ADMIN — METRONIDAZOLE 500 MG: 500 INJECTION, SOLUTION INTRAVENOUS at 22:24

## 2022-03-03 RX ADMIN — IOPAMIDOL 75 ML: 755 INJECTION, SOLUTION INTRAVENOUS at 11:15

## 2022-03-03 RX ADMIN — FAMOTIDINE 20 MG: 10 INJECTION, SOLUTION INTRAVENOUS at 22:21

## 2022-03-03 RX ADMIN — DEXAMETHASONE SODIUM PHOSPHATE 8 MG: 4 INJECTION, SOLUTION INTRAMUSCULAR; INTRAVENOUS at 16:15

## 2022-03-03 RX ADMIN — HYDROMORPHONE HYDROCHLORIDE 1 MG: 1 INJECTION, SOLUTION INTRAMUSCULAR; INTRAVENOUS; SUBCUTANEOUS at 12:33

## 2022-03-03 RX ADMIN — Medication 3 MG: at 16:40

## 2022-03-03 RX ADMIN — SODIUM CHLORIDE, POTASSIUM CHLORIDE, SODIUM LACTATE AND CALCIUM CHLORIDE: 600; 310; 30; 20 INJECTION, SOLUTION INTRAVENOUS at 16:04

## 2022-03-03 RX ADMIN — CIPROFLOXACIN 400 MG: 2 INJECTION, SOLUTION INTRAVENOUS at 23:34

## 2022-03-03 RX ADMIN — FENTANYL CITRATE 50 MCG: 50 INJECTION, SOLUTION INTRAMUSCULAR; INTRAVENOUS at 16:48

## 2022-03-03 RX ADMIN — DICYCLOMINE HYDROCHLORIDE 20 MG: 20 INJECTION, SOLUTION INTRAMUSCULAR at 09:42

## 2022-03-03 RX ADMIN — FENTANYL CITRATE 50 MCG: 50 INJECTION, SOLUTION INTRAMUSCULAR; INTRAVENOUS at 16:09

## 2022-03-03 RX ADMIN — MORPHINE SULFATE 4 MG: 2 INJECTION, SOLUTION INTRAMUSCULAR; INTRAVENOUS at 09:42

## 2022-03-03 RX ADMIN — SODIUM CHLORIDE, PRESERVATIVE FREE 10 ML: 5 INJECTION INTRAVENOUS at 22:22

## 2022-03-03 RX ADMIN — METRONIDAZOLE 500 MG: 500 INJECTION, SOLUTION INTRAVENOUS at 14:00

## 2022-03-03 RX ADMIN — LIDOCAINE HYDROCHLORIDE 100 MG: 20 INJECTION, SOLUTION EPIDURAL; INFILTRATION; INTRACAUDAL; PERINEURAL at 16:10

## 2022-03-03 RX ADMIN — ONDANSETRON 4 MG: 2 INJECTION INTRAMUSCULAR; INTRAVENOUS at 09:43

## 2022-03-03 RX ADMIN — OXYCODONE 5 MG: 5 TABLET ORAL at 22:21

## 2022-03-03 RX ADMIN — FAMOTIDINE 20 MG: 10 INJECTION, SOLUTION INTRAVENOUS at 09:42

## 2022-03-03 RX ADMIN — SODIUM CHLORIDE: 9 INJECTION, SOLUTION INTRAVENOUS at 17:31

## 2022-03-03 RX ADMIN — Medication 0.4 MG: at 16:40

## 2022-03-03 RX ADMIN — ROCURONIUM BROMIDE 45 MG: 50 INJECTION, SOLUTION INTRAVENOUS at 16:17

## 2022-03-03 RX ADMIN — SODIUM CHLORIDE 1000 ML: 0.9 INJECTION, SOLUTION INTRAVENOUS at 09:43

## 2022-03-03 RX ADMIN — CIPROFLOXACIN 400 MG: 2 INJECTION, SOLUTION INTRAVENOUS at 14:30

## 2022-03-03 RX ADMIN — ONDANSETRON 4 MG: 2 INJECTION INTRAMUSCULAR; INTRAVENOUS at 16:15

## 2022-03-03 RX ADMIN — PROPOFOL 180 MG: 10 INJECTION, EMULSION INTRAVENOUS at 16:10

## 2022-03-03 RX ADMIN — FENTANYL CITRATE 50 MCG: 50 INJECTION, SOLUTION INTRAMUSCULAR; INTRAVENOUS at 16:28

## 2022-03-03 RX ADMIN — MORPHINE SULFATE 4 MG: 2 INJECTION, SOLUTION INTRAMUSCULAR; INTRAVENOUS at 10:50

## 2022-03-03 RX ADMIN — ROCURONIUM BROMIDE 5 MG: 50 INJECTION, SOLUTION INTRAVENOUS at 16:10

## 2022-03-03 ASSESSMENT — ENCOUNTER SYMPTOMS
RESPIRATORY NEGATIVE: 1
ABDOMINAL PAIN: 1
VOMITING: 1
NAUSEA: 1
ALLERGIC/IMMUNOLOGIC NEGATIVE: 1
EYES NEGATIVE: 1

## 2022-03-03 ASSESSMENT — PAIN DESCRIPTION - PAIN TYPE
TYPE: SURGICAL PAIN
TYPE: ACUTE PAIN

## 2022-03-03 ASSESSMENT — PAIN DESCRIPTION - LOCATION
LOCATION: ABDOMEN
LOCATION: ABDOMEN

## 2022-03-03 ASSESSMENT — PULMONARY FUNCTION TESTS
PIF_VALUE: 1
PIF_VALUE: 17
PIF_VALUE: 16
PIF_VALUE: 29
PIF_VALUE: 3
PIF_VALUE: 17
PIF_VALUE: 3
PIF_VALUE: 0
PIF_VALUE: 31
PIF_VALUE: 28
PIF_VALUE: 12
PIF_VALUE: 25
PIF_VALUE: 18
PIF_VALUE: 31
PIF_VALUE: 15
PIF_VALUE: 26
PIF_VALUE: 18
PIF_VALUE: 2
PIF_VALUE: 31
PIF_VALUE: 18
PIF_VALUE: 27
PIF_VALUE: 31
PIF_VALUE: 12
PIF_VALUE: 23
PIF_VALUE: 18
PIF_VALUE: 16
PIF_VALUE: 0
PIF_VALUE: 1
PIF_VALUE: 11
PIF_VALUE: 18
PIF_VALUE: 1
PIF_VALUE: 16
PIF_VALUE: 31
PIF_VALUE: 18
PIF_VALUE: 16
PIF_VALUE: 0
PIF_VALUE: 4
PIF_VALUE: 17
PIF_VALUE: 12
PIF_VALUE: 25
PIF_VALUE: 30
PIF_VALUE: 0
PIF_VALUE: 24
PIF_VALUE: 11
PIF_VALUE: 30
PIF_VALUE: 17
PIF_VALUE: 31
PIF_VALUE: 19
PIF_VALUE: 13
PIF_VALUE: 2
PIF_VALUE: 5
PIF_VALUE: 30
PIF_VALUE: 19
PIF_VALUE: 26
PIF_VALUE: 31
PIF_VALUE: 17

## 2022-03-03 ASSESSMENT — PAIN DESCRIPTION - ORIENTATION
ORIENTATION: RIGHT;LOWER
ORIENTATION: RIGHT

## 2022-03-03 ASSESSMENT — PAIN DESCRIPTION - DESCRIPTORS
DESCRIPTORS: TENDER
DESCRIPTORS: SHARP

## 2022-03-03 ASSESSMENT — PAIN DESCRIPTION - ONSET: ONSET: ON-GOING

## 2022-03-03 ASSESSMENT — PAIN SCALES - GENERAL
PAINLEVEL_OUTOF10: 0
PAINLEVEL_OUTOF10: 8
PAINLEVEL_OUTOF10: 2
PAINLEVEL_OUTOF10: 0
PAINLEVEL_OUTOF10: 4
PAINLEVEL_OUTOF10: 8
PAINLEVEL_OUTOF10: 8
PAINLEVEL_OUTOF10: 5

## 2022-03-03 ASSESSMENT — PAIN - FUNCTIONAL ASSESSMENT: PAIN_FUNCTIONAL_ASSESSMENT: ACTIVITIES ARE NOT PREVENTED

## 2022-03-03 ASSESSMENT — PAIN DESCRIPTION - FREQUENCY
FREQUENCY: CONTINUOUS
FREQUENCY: CONTINUOUS

## 2022-03-03 ASSESSMENT — PAIN DESCRIPTION - PROGRESSION: CLINICAL_PROGRESSION: NOT CHANGED

## 2022-03-03 NOTE — OP NOTE
Operative Note      Patient: Emilio Jackson  YOB: 1985  MRN: [id number]    Date of Procedure: 3/3/2022     Detailed Description of Procedure:   Dictated.      Electronically signed by Chauncey Fields MD on 3/3/2022 at 5:05 PM

## 2022-03-03 NOTE — BRIEF OP NOTE
Brief Postoperative Note      Patient: Madonna Gutierrez  YOB: 1985  MRN: [id number]    Date of Procedure: 3/3/2022    Pre-Op Diagnosis: Acute Appendicitis, non-perforated.      Post-Op Diagnosis: Same       Procedure(s):  APPENDECTOMY LAPAROSCOPIC    Surgeon(s):  Doris Weldon MD    Assistant:  * No surgical staff found *    Anesthesia: General    Estimated Blood Loss (mL): Minimal    Complications: None    Specimens:   ID Type Source Tests Collected by Time Destination   A : APPENDIX Tissue Appendix SURGICAL PATHOLOGY Doris Weldon MD 3/3/2022 1631        Implants:  * No implants in log *      Drains: * No LDAs found *    Findings: as above    Electronically signed by Doris Weldon MD on 3/3/2022 at 5:04 PM

## 2022-03-03 NOTE — PROGRESS NOTES
1701 Arrived to [facility], monitors placed and alarms on. Report received from [employer] RN/Jennifer LIAO. Patient arrived with oral airway in place. [id number] Patient arousable and oral air removed. O2 sats maintained. Patient awake and alert. 1730 Tolerating ice chips, denies any complaints. 46 Sister updated on patient status and room, aware 1128 is being cleaned. 1825 Patient transported to room 1128.

## 2022-03-03 NOTE — ED NOTES
Pt placed in gown, belongings place in bagged with pt sticker. Consent signed and placed with pt chart.       Jennifer Puente RN  03/03/22 0606

## 2022-03-03 NOTE — PROGRESS NOTES
Upon admission to , a dual skin assessment was performed by Pinky Mcnluty RN, and Vijay SIMS RN.      Laparoscopic sites 3x on abdomen. Clean, dry, intact. LDA added.

## 2022-03-03 NOTE — ED NOTES
Reading Providers    Read Date Phone Pager   Rosa Elena Win Mar 3, 2022 11:39 [phone]          CT ABDOMEN PELVIS W IV CONTRAST Additional Contrast? None: Patient Communication     Not Released  Not seen     Radiation Dose Estimates    No radiation information found for this patient  Impression   1. Acute, uncomplicated appendicitis   2.  Moderate hepatic steatosis       RECOMMENDATIONS:   Unavailable              Sun Barillas RN  03/03/22 2825

## 2022-03-03 NOTE — ANESTHESIA PRE PROCEDURE
Department of Anesthesiology  Preprocedure Note       Name:  Melvin Soni   Age:  39 y.o.  :  1985                                          MRN:  [id number]         Date:  3/3/2022      Surgeon: Amrita Troy):  Gavino Felix MD    Procedure: Procedure(s):  APPENDECTOMY LAPAROSCOPIC    Medications prior to admission:   Prior to Admission medications    Medication Sig Start Date End Date Taking? Authorizing Provider   venlafaxine (EFFEXOR XR) 75 MG extended release capsule Take 1 capsule by mouth daily 10/19/16   Jordy Segura PA-C   pantoprazole (PROTONIX) 40 MG tablet Take 1 tablet by mouth daily 10/19/16   Alonso Segura PA-C       Current medications:    Current Facility-Administered Medications   Medication Dose Route Frequency Provider Last Rate Last Admin    morphine (PF) injection 4 mg  4 mg IntraVENous Q30 Min PRN ixigo, DO        ondansetron (ZOFRAN) injection 4 mg  4 mg IntraVENous Q30 Min PRN ixigo, DO        ciprofloxacin (CIPRO) IVPB 400 mg  400 mg IntraVENous Q12H ixigo, DO        metronidazole (FLAGYL) 500 mg in NaCl 100 mL IVPB premix  500 mg IntraVENous Once ixigo, DO         Current Outpatient Medications   Medication Sig Dispense Refill    venlafaxine (EFFEXOR XR) 75 MG extended release capsule Take 1 capsule by mouth daily 30 capsule 5    pantoprazole (PROTONIX) 40 MG tablet Take 1 tablet by mouth daily 30 tablet 5       Allergies:     Allergies   Allergen Reactions    Penicillins        Problem List:    Patient Active Problem List   Diagnosis Code    Lateral epicondylitis M77.10    Lateral epicondylitis (tennis elbow) M77.10    Snoring R06.83    Hypersomnolence G47.10    LATONIA (obstructive sleep apnea) G47.33       Past Medical History:        Diagnosis Date    Chronic back pain     Depression     GERD (gastroesophageal reflux disease)     Vitamin D deficiency        Past Surgical History:        Procedure Laterality Date  ELBOW SURGERY Left 03/2015    Dr Boy Leyva procedure    HERNIA REPAIR  2002    right       Social History:    Social History     Tobacco Use    Smoking status: Never Smoker    Smokeless tobacco: Never Used   Substance Use Topics    Alcohol use: Yes     Comment: socially                                Counseling given: Not Answered      Vital Signs (Current):   Vitals:    03/03/22 0917 03/03/22 1108 03/03/22 1300   BP: (!) 148/91 (!) 138/90 131/73   Pulse: 111 96 104   Resp: 16 18    Temp: 36.7 °C (98.1 °F)     TempSrc: Oral     SpO2: 99% 99% 95%   Weight: 200 lb (90.7 kg)     Height: 5' 10\" (1.778 m)                                                BP Readings from Last 3 Encounters:   03/03/22 131/73   10/19/16 120/86   08/21/12 122/71       NPO Status:                                                                                 BMI:   Wt Readings from Last 3 Encounters:   03/03/22 200 lb (90.7 kg)   05/13/21 195 lb (88.5 kg)   02/26/21 195 lb (88.5 kg)     Body mass index is 28.7 kg/m². CBC:   Lab Results   Component Value Date    WBC 20.8 03/03/2022    RBC 5.14 03/03/2022    HGB 14.7 03/03/2022    HCT 43.6 03/03/2022    MCV 84.8 03/03/2022    RDW 12.8 03/03/2022     03/03/2022       CMP:   Lab Results   Component Value Date     03/03/2022    K 3.6 03/03/2022    CL 95 03/03/2022    CO2 25 03/03/2022    BUN 11 03/03/2022    CREATININE 0.8 03/03/2022    GFRAA >60 03/03/2022    LABGLOM >60 03/03/2022    GLUCOSE 157 03/03/2022    PROT 7.8 03/03/2022    CALCIUM 9.6 03/03/2022    BILITOT 0.5 03/03/2022    ALKPHOS 104 03/03/2022    AST 28 03/03/2022    ALT 42 03/03/2022       POC Tests: No results for input(s): POCGLU, POCNA, POCK, POCCL, POCBUN, POCHEMO, POCHCT in the last 72 hours.     Coags: No results found for: PROTIME, INR, APTT    HCG (If Applicable): No results found for: PREGTESTUR, PREGSERUM, HCG, HCGQUANT     ABGs: No results found for: PHART, PO2ART, CMI8WVF, ICL0FLX, BEART,

## 2022-03-03 NOTE — H&P
[address] [place] [address], [address] [place] [address]                              HISTORY AND PHYSICAL    PATIENT NAME: Alejandro Lloyd                       :        1985  MED REC NO:   [id number]                          ROOM:  ACCOUNT NO:   [de-identified]                           ADMIT DATE: 2022  PROVIDER:     Luis Alfredo Disla MD    ADMITTING DIAGNOSIS:  Acute uncomplicated appendicitis. CHIEF COMPLAINT AND HISTORY OF PRESENT ILLNESS:  The patient is a very  pleasant 80-year-old  male, who yesterday evening about 6  o'clock started having some periumbilical and also epigastric abdominal  pain. He denied any fevers or chills, but did have some nausea and some  emesis. He denied any sick contacts or recent travel. The patient has  also not been vaccinated for COVID on further questioning. He then  stated that his pain has slowly kind of migrated down into the right  lower quadrant, continued to persist, he then came to [facility] for further evaluation. On that evaluation, he  was shown to have leukocytosis of almost 21,000 and basically normal  liver function tests and basic metabolic panel. His rapid COVID test  was negative, and the urinalysis was also negative. He denied any  dysuria, urgency, frequency, or hematuria. He denied any history of  renal calculi. He has not had any changes in bowel habits, and no prior  history of constipation or diarrhea. A CT of the abdomen and pelvis was  then undertaken and that did reveal acute uncomplicated appendicitis  with some moderate hepatic steatosis. I was then consulted to see this  patient regarding his surgical problem. PAST MEDICAL HISTORY:  Chronic back pain, depression, GERD, and vitamin  D deficiency. PAST SURGICAL HISTORY:  He had left elbow surgery in . He has also  had a right inguinal hernia repair in .     FAMILY HISTORY: Noncontributory. SOCIAL HISTORY:  He is . Denies smoking, alcohol, or IV drug  abuse. ALLERGIES:  PENICILLINS:    MEDICATIONS:  Please refer to medication reconciliation sheet. REVIEW OF SYSTEMS:  Ten-point review of systems is otherwise negative  unless stated as above in the history of present illness. PHYSICAL EXAMINATION:  VITAL SIGNS:  His temperature is 98.1, respiratory rate 16, pulse 101,  blood pressure 126/72, and O2 sat is 96% on room air. GENERAL:  Generally speaking, in no acute distress. Alert, awake, and  oriented x3. HEAD, EARS, EYES, NOSE, AND THROAT:  Normocephalic, atraumatic. Pupils  are equal, round, and reactive to light. Extraocular muscles are  intact. Trachea is midline. No lymphadenopathy. Anicteric sclerae. Moist mucous membranes. NECK:  No JVD or bruits. HEART:  Positive S1, S2.  Normal sinus rhythm. No murmurs or gallops. CHEST:  Clear to auscultation bilaterally. Air entry bilaterally equal.  No rales, rhonchi, wheezes, or crackles. ABDOMEN:  Soft, nondistended. Right lower quadrant tenderness at  McBurney's point with guarding, but no rebound. Negative Rovsing and  obturator signs. No abdominal wall masses. No abdominal wall skin  changes. Some right-sided CVA tenderness. EXTREMITIES:  Negative for erythema, edema, cellulitis, and cyanosis. ASSESSMENT AND PLAN:  The patient is a 51-year-old  male, who  presents with about a 1-day history of abdominal pain, which has now  migrated down to the right lower quadrant. CT findings consistent with  uncomplicated acute appendicitis and a CT did reveal an appendicolith as  well. The patient has already been made n.p.o.  I have recommended we  proceed to the operating room for laparoscopic possible open  appendectomy. I did discuss all the risks, benefits, and alternatives  of the procedure with the patient. His operative consent was signed,  placed upon the chart.   I will start the antibiotics, ciprofloxacin and  Flagyl as well.         Romana Vivar MD    D: 03/03/2022 15:32:08       T: 03/03/2022 15:35:01     [email or web address]  Job#: [id number]     Doc#: [id number]    CC:

## 2022-03-03 NOTE — ED PROVIDER NOTES
[address] [facility]      TRIAGE CHIEF COMPLAINT:   Abdominal Pain (conplaining of right lower abdominal pain for 13 hours. Patient came from urgent care. Possible appendectis)      [place]:  Leanne Griffin is a 39 y.o. male that presents with complaint of abdominal pain nausea vomiting. Symptoms since 6:00 last evening that the last time he ate. Went to urgent care today who sent him here for right lower quadrant pain possible appendicitis. Patient has no abdominal surgeries states constant abdominal pain worse on the right lower quadrant. Denies any fevers chest pain shortness of breath travel sick contacts urine complaints no diarrhea constipation. No other questions or concerns. REVIEW OF SYSTEMS:  At least 10 systems reviewed and otherwise acutely negative except as in the [address] Review of Systems   Constitutional: Negative. HENT: Negative. Eyes: Negative. Respiratory: Negative. Cardiovascular: Negative. Gastrointestinal: Positive for abdominal pain, nausea and vomiting. Endocrine: Negative. Genitourinary: Negative. Musculoskeletal: Negative. Skin: Negative. Allergic/Immunologic: Negative. Neurological: Negative. Hematological: Negative. Psychiatric/Behavioral: Negative. All other systems reviewed and are negative.       Past Medical History:   Diagnosis Date    Chronic back pain     Depression     GERD (gastroesophageal reflux disease)     Vitamin D deficiency 2012     Past Surgical History:   Procedure Laterality Date    ELBOW SURGERY Left 03/2015    Dr Ronel Monson procedure    HERNIA REPAIR  2002    right     Family History   Adopted: Yes   Problem Relation Age of Onset    Diabetes Mother     Diabetes Father      Social History     Socioeconomic History    Marital status:      Spouse name: Not on file    Number of children: Not on file    Years of education: Not on file    Highest education level: Not on file   Occupational History    Not on file   Tobacco Use    Smoking status: Never Smoker    Smokeless tobacco: Never Used   Substance and Sexual Activity    Alcohol use: Yes     Comment: socially    Drug use: No    Sexual activity: Not on file   Other Topics Concern    Not on file   Social History Narrative    Not on file     Social Determinants of Health     Financial Resource Strain:     Difficulty of Paying Living Expenses: Not on file   Food Insecurity:     Worried About Running Out of Food in the Last Year: Not on file    Consuelo of Food in the Last Year: Not on file   Transportation Needs:     Lack of Transportation (Medical): Not on file    Lack of Transportation (Non-Medical):  Not on file   Physical Activity:     Days of Exercise per Week: Not on file    Minutes of Exercise per Session: Not on file   Stress:     Feeling of Stress : Not on file   Social Connections:     Frequency of Communication with Friends and Family: Not on file    Frequency of Social Gatherings with Friends and Family: Not on file    Attends Quaker Services: Not on file    Active Member of [address] [place] [address] or Organizations: Not on file    Attends Club or Organization Meetings: Not on file    Marital Status: Not on file   Intimate Partner Violence:     Fear of Current or Ex-Partner: Not on file    Emotionally Abused: Not on file    Physically Abused: Not on file    Sexually Abused: Not on file   Housing Stability:     Unable to Pay for Housing in the Last Year: Not on file    Number of [place] in the Last Year: Not on file    Unstable Housing in the Last Year: Not on file     Current Facility-Administered Medications   Medication Dose Route Frequency Provider Last Rate Last Admin    morphine (PF) injection 4 mg  4 mg IntraVENous Q30 Min PRN Mark Veliz DO        ond[facility]etron [facility]) injection 4 mg  4 mg IntraVENous Q30 Min PRN Mark Veliz DO        ciprofloxacin (CIPRO) IVPB 400 mg  400 mg IntraVENous Q12H Farida Jain Chadwick, DO        metronidazole (FLAGYL) 500 mg in NaCl 100 mL IVPB premix  500 mg IntraVENous Once Lewayne Ditty, DO         Current Outpatient Medications   Medication Sig Dispense Refill    venlafaxine (EFFEXOR XR) 75 MG extended release capsule Take 1 capsule by mouth daily 30 capsule 5    pantoprazole (PROTONIX) 40 MG tablet Take 1 tablet by mouth daily 30 tablet 5      Allergies   Allergen Reactions    Penicillins      Current Facility-Administered Medications   Medication Dose Route Frequency Provider Last Rate Last Admin    morphine (PF) injection 4 mg  4 mg IntraVENous Q30 Min PRN Danielito Diamond, DO        ondansetron (ZOFRAN) injection 4 mg  4 mg IntraVENous Q30 Min PRN Danielito Diamond, DO        ciprofloxacin (CIPRO) IVPB 400 mg  400 mg IntraVENous Q12H Danielito Diamond, DO        metronidazole (FLAGYL) 500 mg in NaCl 100 mL IVPB premix  500 mg IntraVENous Once Danielito Ditty, DO         Current Outpatient Medications   Medication Sig Dispense Refill    venlafaxine (EFFEXOR XR) 75 MG extended release capsule Take 1 capsule by mouth daily 30 capsule 5    pantoprazole (PROTONIX) 40 MG tablet Take 1 tablet by mouth daily 30 tablet 5       Nursing Notes Reviewed    VITAL SIGNS:  ED Triage Vitals   Enc Vitals Group      BP       Pulse       Resp       Temp       Temp src       SpO2       Weight       Height       Head Circumference       Peak Flow       Pain Score       Pain Loc       Pain Edu? Excl. in [address]? PHYSICAL EXAM:  Physical Exam  Vitals and nursing note reviewed. Constitutional:       General: He is not in acute distress. Appearance: Normal appearance. He is well-developed. He is not ill-appearing, toxic-appearing or diaphoretic. HENT:      Head: Normocephalic and atraumatic. Right Ear: External ear normal.      Left Ear: External ear normal.   Eyes:      General: No scleral icterus. Left eye: No discharge.       Extraocular Movements: Extraocular movements intact. Conjunctiva/sclera: Conjunctivae normal.   Cardiovascular:      Rate and Rhythm: Normal rate and regular rhythm. Heart sounds: Normal heart sounds. No murmur heard. No friction rub. No gallop. Pulmonary:      Effort: Pulmonary effort is normal. No respiratory distress. Breath sounds: Normal breath sounds. No stridor. No wheezing, rhonchi or rales. Abdominal:      General: Bowel sounds are normal. There is no distension. Palpations: Abdomen is soft. There is no mass. Tenderness: There is generalized abdominal tenderness and tenderness in the right upper quadrant and right lower quadrant. There is no guarding or rebound. Negative signs include Shields's sign, Rovsing's sign and McBurney's sign. Hernia: No hernia is present. Musculoskeletal:         General: No swelling, deformity or signs of injury. Normal range of motion. Cervical back: Normal range of motion. No rigidity. Right lower leg: No edema. Left lower leg: No edema. Skin:     General: Skin is warm. Coloration: Skin is not jaundiced or pale. Findings: No bruising, erythema, lesion or rash. Neurological:      General: No focal deficit present. Mental Status: He is alert and oriented to person, place, and time. GCS: GCS eye subscore is 4. GCS verbal subscore is 5. GCS motor subscore is 6. Cranial Nerves: Cranial nerves are intact. No cranial nerve deficit, dysarthria or facial asymmetry. Sensory: Sensation is intact. No sensory deficit. Motor: Motor function is intact. No weakness, tremor, atrophy, abnormal muscle tone or seizure activity. Coordination: Coordination normal.      Gait: Gait normal.   Psychiatric:         Mood and Affect: Mood normal.         Behavior: Behavior normal.         Thought Content:  Thought content normal.         Judgment: Judgment normal.           I have reviewed andinterpreted all of the currently available lab results from this visit (if applicable):    Results for orders placed or performed during the hospital encounter of 03/03/22   CBC with Auto Differential   Result Value Ref Range    WBC 20.8 (H) 4.0 - 10.5 K/CU MM    RBC 5.14 4.6 - 6.2 M/CU MM    Hemoglobin 14.7 13.5 - 18.0 GM/DL    Hematocrit 43.6 42 - 52 %    MCV 84.8 78 - 100 FL    MCH 28.6 27 - 31 PG    MCHC 33.7 32.0 - 36.0 %    RDW 12.8 11.7 - 14.9 %    Platelets 622 (H) 039 - 440 K/CU MM    MPV 8.7 7.5 - 11.1 FL    Differential Type AUTOMATED DIFFERENTIAL     Segs Relative 86.3 (H) 36 - 66 %    Lymphocytes % 4.0 (L) 24 - 44 %    Monocytes % 9.1 (H) 0 - 4 %    Eosinophils % 0.0 0 - 3 %    Basophils % 0.2 0 - 1 %    Segs Absolute 17.9 K/CU MM    Lymphocytes Absolute 0.8 K/CU MM    Monocytes Absolute 1.9 K/CU MM    Eosinophils Absolute 0.0 K/CU MM    Basophils Absolute 0.0 K/CU MM    Nucleated RBC % 0.0 %    Total Nucleated RBC 0.0 K/CU MM    Total Immature Neutrophil 0.09 K/CU MM    Immature Neutrophil % 0.4 0 - 0.43 %   Comprehensive Metabolic Panel   Result Value Ref Range    Sodium 133 (L) 135 - 145 MMOL/L    Potassium 3.6 3.5 - 5.1 MMOL/L    Chloride 95 (L) 99 - 110 mMol/L    CO2 25 21 - 32 MMOL/L    BUN 11 6 - 23 MG/DL    CREATININE 0.8 (L) 0.9 - 1.3 MG/DL    Glucose 157 (H) 70 - 99 MG/DL    Calcium 9.6 8.3 - 10.6 MG/DL    Albumin 4.7 3.4 - 5.0 GM/DL    Total Protein 7.8 6.4 - 8.2 GM/DL    Total Bilirubin 0.5 0.0 - 1.0 MG/DL    ALT 42 (H) 10 - 40 U/L    AST 28 15 - 37 IU/L    Alkaline Phosphatase 104 40 - 129 IU/L    GFR Non-African American >60 >60 mL/min/1.73m2    GFR African American >60 >60 mL/min/1.73m2    Anion Gap 13 4 - 16   Lipase   Result Value Ref Range    Lipase 15 13 - 60 IU/L   Urinalysis   Result Value Ref Range    Color, UA YELLOW YELLOW    Clarity, UA CLEAR CLEAR    Glucose, Urine NEGATIVE NEGATIVE MG/DL    Bilirubin Urine NEGATIVE NEGATIVE MG/DL    Ketones, Urine NEGATIVE NEGATIVE MG/DL    Specific Gravity, UA 1.020 1.001 - 1.035 Blood, Urine NEGATIVE NEGATIVE    pH, Urine 7.5 5.0 - 8.0    Protein, UA NEGATIVE NEGATIVE MG/DL    Urobilinogen, Urine 0.2 0.2 - 1.0 MG/DL    Nitrite Urine, Quantitative NEGATIVE NEGATIVE    Leukocyte Esterase, Urine NEGATIVE NEGATIVE    RBC, UA NONE SEEN 0 - 3 /HPF    WBC, UA <1 0 - 2 /HPF    Bacteria, UA NEGATIVE NEGATIVE /HPF    Mucus, UA MANY (A) NEGATIVE HPF    Amorphous, UA RARE /HPF        Radiographs (if obtained):  [] The following radiograph was interpreted by myself in the absence of a radiologist:  [x] Radiologist's Report Reviewed:    CT Abd/pelv    EKG (if obtained): (All EKG's are interpreted by myself in the absence of a cardiologist)    Total critical care time today provided was at least 35 minutes. This excludes seperately billable procedure. Critical care time provided for reviewing labs, images giving IV fluids, antibiotics, consulting surgery that required close evaluation and/or intervention with concern for patient decompensation. MDM:    Patient here with abdominal pain nausea vomiting. Again symptoms since 6:00 last evening at the last time he ate. Went to urgent care this morning they sent him here for evaluation for possible appendicitis. Patient states he has no abdominal surgeries he otherwise appears well he does have right lower quadrant pain but also generalized but worse in the right lower quadrant. Will get labs, imaging will give him pain nausea medicine. No hx of kidney stone, denies urine complaints or diarrhea/constipation. Patient found to have uncomplicated appendicitis. I did talk to his general surgeon of choice who will see him in ER will take hopefully to surgery today. Did order antibiotics, fluids, pain medicine kept n.p.o. since last night at 6 PM.  Surgery will admit the patient hopeful surgery this afternoon I did order rapid COVID otherwise patient is asymptomatic admitted.     CLINICAL IMPRESSION:  Final diagnoses:   Abdominal pain, unspecified abdominal location   Nausea and vomiting, intractability of vomiting not specified, unspecified vomiting type   Leukocytosis, unspecified type   Acute appendicitis, unspecified acute appendicitis type       (Please note that portions of this note may have been completed with a voice recognition program. Efforts were made to edit the dictations but occasionally words aremis-transcribed.)    DISPOSITION REFERRAL (if applicable):  No follow-up provider specified.     DISPOSITION MEDICATIONS (if applicable):  New Prescriptions    No medications on file          Vance Chatman, [facility],   03/03/22 9226

## 2022-03-04 VITALS
BODY MASS INDEX: 28.63 KG/M2 | SYSTOLIC BLOOD PRESSURE: 112 MMHG | WEIGHT: 200 LBS | TEMPERATURE: 97.7 F | HEIGHT: 70 IN | RESPIRATION RATE: 18 BRPM | OXYGEN SATURATION: 96 % | DIASTOLIC BLOOD PRESSURE: 68 MMHG | HEART RATE: 92 BPM

## 2022-03-04 LAB
ANION GAP SERPL CALCULATED.3IONS-SCNC: 8 MMOL/L (ref 4–16)
BUN BLDV-MCNC: 10 MG/DL (ref 6–23)
CALCIUM SERPL-MCNC: 8.7 MG/DL (ref 8.3–10.6)
CHLORIDE BLD-SCNC: 95 MMOL/L (ref 99–110)
CO2: 24 MMOL/L (ref 21–32)
CREAT SERPL-MCNC: 0.8 MG/DL (ref 0.9–1.3)
CULTURE: NORMAL
GFR AFRICAN AMERICAN: >60 ML/MIN/1.73M2
GFR NON-AFRICAN AMERICAN: >60 ML/MIN/1.73M2
GLUCOSE BLD-MCNC: 116 MG/DL (ref 70–99)
HCT VFR BLD CALC: 42.3 % (ref 42–52)
HEMOGLOBIN: 13.4 GM/DL (ref 13.5–18)
Lab: NORMAL
MCH RBC QN AUTO: 28.5 PG (ref 27–31)
MCHC RBC AUTO-ENTMCNC: 31.7 % (ref 32–36)
MCV RBC AUTO: 89.8 FL (ref 78–100)
PDW BLD-RTO: 13.3 % (ref 11.7–14.9)
PLATELET # BLD: 338 K/CU MM (ref 140–440)
PMV BLD AUTO: 8.7 FL (ref 7.5–11.1)
POTASSIUM SERPL-SCNC: 3.7 MMOL/L (ref 3.5–5.1)
RBC # BLD: 4.71 M/CU MM (ref 4.6–6.2)
SODIUM BLD-SCNC: 127 MMOL/L (ref 135–145)
SPECIMEN: NORMAL
WBC # BLD: 15.2 K/CU MM (ref 4–10.5)

## 2022-03-04 PROCEDURE — 36415 COLL VENOUS BLD VENIPUNCTURE: CPT

## 2022-03-04 PROCEDURE — 80048 BASIC METABOLIC PNL TOTAL CA: CPT

## 2022-03-04 PROCEDURE — 2500000003 HC RX 250 WO HCPCS: Performed by: SURGERY

## 2022-03-04 PROCEDURE — 6360000002 HC RX W HCPCS: Performed by: SURGERY

## 2022-03-04 PROCEDURE — 85027 COMPLETE CBC AUTOMATED: CPT

## 2022-03-04 PROCEDURE — 6370000000 HC RX 637 (ALT 250 FOR IP): Performed by: SURGERY

## 2022-03-04 PROCEDURE — 96372 THER/PROPH/DIAG INJ SC/IM: CPT

## 2022-03-04 PROCEDURE — G0378 HOSPITAL OBSERVATION PER HR: HCPCS

## 2022-03-04 PROCEDURE — 96375 TX/PRO/DX INJ NEW DRUG ADDON: CPT

## 2022-03-04 RX ORDER — OXYCODONE HYDROCHLORIDE AND ACETAMINOPHEN 5; 325 MG/1; MG/1
1 TABLET ORAL EVERY 6 HOURS PRN
Qty: 12 TABLET | Refills: 0 | Status: SHIPPED | OUTPATIENT
Start: 2022-03-04 | End: 2022-03-11

## 2022-03-04 RX ADMIN — ENOXAPARIN SODIUM 40 MG: 100 INJECTION SUBCUTANEOUS at 09:17

## 2022-03-04 RX ADMIN — METRONIDAZOLE 500 MG: 500 INJECTION, SOLUTION INTRAVENOUS at 06:25

## 2022-03-04 RX ADMIN — OXYCODONE 5 MG: 5 TABLET ORAL at 02:27

## 2022-03-04 RX ADMIN — OXYCODONE HYDROCHLORIDE 10 MG: 10 TABLET ORAL at 06:30

## 2022-03-04 RX ADMIN — FAMOTIDINE 20 MG: 10 INJECTION, SOLUTION INTRAVENOUS at 09:10

## 2022-03-04 ASSESSMENT — PAIN SCALES - GENERAL
PAINLEVEL_OUTOF10: 2
PAINLEVEL_OUTOF10: 4
PAINLEVEL_OUTOF10: 7

## 2022-03-04 ASSESSMENT — PAIN DESCRIPTION - PROGRESSION
CLINICAL_PROGRESSION: NOT CHANGED
CLINICAL_PROGRESSION: NOT CHANGED

## 2022-03-04 ASSESSMENT — PAIN DESCRIPTION - FREQUENCY: FREQUENCY: CONTINUOUS

## 2022-03-04 ASSESSMENT — PAIN DESCRIPTION - PAIN TYPE: TYPE: SURGICAL PAIN

## 2022-03-04 ASSESSMENT — PAIN DESCRIPTION - ORIENTATION: ORIENTATION: RIGHT

## 2022-03-04 ASSESSMENT — PAIN DESCRIPTION - DESCRIPTORS: DESCRIPTORS: SHARP

## 2022-03-04 ASSESSMENT — PAIN DESCRIPTION - LOCATION: LOCATION: ABDOMEN

## 2022-03-04 ASSESSMENT — PAIN DESCRIPTION - ONSET: ONSET: ON-GOING

## 2022-03-04 ASSESSMENT — PAIN - FUNCTIONAL ASSESSMENT: PAIN_FUNCTIONAL_ASSESSMENT: ACTIVITIES ARE NOT PREVENTED

## 2022-03-04 NOTE — OP NOTE
[address] [place] [address], [address] [place] [address]                                OPERATIVE REPORT    PATIENT NAME: Krzysztof Moreno                       :        1985  MED REC NO:   [id number]                          ROOM:       5038  ACCOUNT NO:   [de-identified]                           ADMIT DATE: 2022  PROVIDER:     Rosalinda Bean MD    DATE OF PROCEDURE:  2022    PREOPERATIVE DIAGNOSIS:  Acute appendicitis with localized peritonitis,  nonperforated. POSTOPERATIVE DIAGNOSIS:  Acute appendicitis with localized peritonitis,  nonperforated. PROCEDURE PERFORMED:  Laparoscopic appendectomy. SURGEON:  Rosalinda Bean MD    ANESTHESIA:  General anesthesia combined with endotracheal tube  intubation. SPECIMENS:  Appendix. COMPLICATIONS:  None. DRAINS:  None. ESTIMATED BLOOD LOSS:  Negligible. INTRAOPERATIVE FINDINGS:  As above. INDICATIONS FOR PROCEDURE:  The patient is very pleasant 78-year-old   male who at six in the evening yesterday started having  periumbilical and epigastric pain that continued to worsen throughout  the night and also today. He then presented to [place] emergency room department for further evaluation and  was shown to have leukocytosis of 21,000 and CT findings consistent with  non-complicated acute appendicitis with the presence of an  appendicolith. Based on the patient's physical exam, laboratory values,  imaging studies, I did feel that he would benefit from laparoscopic  appendectomy. I did discuss all the risks, benefits alternatives of the  procedure in detail to the patient. His operative consent has been  signed, placed upon the chart. COVID test was negative. He received  the antibiotics, ciprofloxacin along with Flagyl. Consent was signed,  placed upon the chart.     DESCRIPTION OF PROCEDURE:  The patient was brought to the operating  room, placed in a supine position. Bilateral lower extremity  compression boots were placed. General anesthesia combined with  endotracheal tube intubation was then performed. Universal time-out was  then performed verifying the patient, date of birth, site of surgery,  and we were all in agreement to proceed. The patient's left upper  extremity was then tucked to the side with all landmarks being padded  appropriately. The patient's abdomen was then sterilely prepped and  draped in standard surgical fashion. Local was infiltrated into a  supraumbilical site. Small skin incision made 11-blade scalpel and a  5-mm optical trocar was then utilized to gain entry into the abdominal  cavity. The abdomen was insufflated with carbon dioxide to create the  pneumoperitoneum to a pressure of 50 mmHg. The patient tolerated the  insufflation well. A 5-mm 30-degree laparoscope was inserted and the  abdomen was inspected. Initial diagnostic laparoscopy demonstrated no  injury at port entry site. I then placed him into the Trendelenburg  position with the left side down. An additional 5-mm trocar was placed  in the midline suprapubically under direct vision and a 12-mm trocar was  then placed also in the left lateral lower quadrant and lateral to the  inferior epigastric vessels. No bleeding was noted on trocar site  placement and no damage was done to any intraabdominal viscera. Laparoscopic Ren clamp was then taken. I was able to move the  terminal ileum out of the way and see the appendix which was acutely  inflamed, but no evidence of any perforation up to the right abdominal  sidewall. I then grasped the tip of the appendix, was able to elevate  it off and free up from the peritoneal surface and retracted it toward  the left upper quadrant to expose the mesoappendix.   I then took the  mesoappendix down with the LigaSure device all the way to the base of  the appendix and once this has been completely taken down, I then took  an [place] 60-mm blue tissue stapling device across the appendix to  perform the appendectomy. The appendix was then placed endoscopic  retrieval bag and easily removed from the abdomen through the left lower  quadrant trocar site. The appendix was then sent off for pathological  examination. The 12-mm trocar was reintroduce. I just mildly irrigated  out the right lower quadrant, suctioned it free. I reexamined my  appendiceal stump staple line and was shown to be intact and also  hemostatic. No other abdominal abnormalities were noted. I then took a  Errol-Get device and close to the left lower quadrant over the  trocar fascial defect with an 0 Vicryl stitch. All secondary trocars  were then removed under direct vision. The abdomen was allowed to  desufflate fully and suprapubic trocar was removed. Additional local  was then infiltrated at all trocar sites to allow for adequate postop  pain control. All trocar incisions were then reapproximated with simple  interrupted 5-0 nylon sutures and dry sterile dressings were then  placed. The patient was then awoken from anesthesia, extubated, sent to  recovery room in satisfactory stable condition.         Mat Shetty MD    D: 03/03/2022 17:16:36       T: 03/03/2022 17:19:27     [email or web address]  Job#: [id number]     Doc#: [id number]    CC:

## 2022-03-04 NOTE — ANESTHESIA POSTPROCEDURE EVALUATION
Department of Anesthesiology  Postprocedure Note    Patient: Cozetta Phalen  MRN: [id number]  YOB: 1985  Date of evaluation: 3/3/2022  Time:  7:04 PM     Procedure Summary     Date: 03/03/22 Room / Location: [address]    Anesthesia Start: 5907 Anesthesia Stop: 1708    Procedure: APPENDECTOMY LAPAROSCOPIC (N/A Abdomen) Diagnosis: (Acute Appendicitis)    Surgeons: Kaylyn Gardner MD Responsible Provider: Bridger Doran MD    Anesthesia Type: general ASA Status: 2 - Emergent          Anesthesia Type: general    Norm Phase I: Norm Score: 9    Norm Phase II:      Last vitals: Reviewed and per EMR flowsheets.        Anesthesia Post Evaluation    Patient location during evaluation: PACU  Patient participation: complete - patient participated  Level of consciousness: sleepy but conscious  Pain score: 2  Airway patency: patent  Nausea & Vomiting: no nausea and no vomiting  Complications: no  Cardiovascular status: hemodynamically stable  Respiratory status: acceptable  Hydration status: euvolemic

## 2022-03-04 NOTE — FLOWSHEET NOTE
Notified Dr Ba Lab r/g AM labs, low NA. States ok to proceed with discharge.  Will continue to monitor

## 2022-03-04 NOTE — DISCHARGE INSTR - DIET

## 2022-03-04 NOTE — PROGRESS NOTES
Urine culture with no growth. POD 1 laparoscopic appendectomy for uncomplicated acute appendicitis. Afebrile and hemodynamically stable. Good urine output. Labs pending. Urine culture with no growth. No acute issues overnight. Abdomen is soft and right lower quadrant tenderness is gone. He does have trocar site tenderness mainly in the left lower quadrant which is to be expected because of transabdominal fixation suture. Continue with IV antibiotics and I will advance his diet as tolerated. Discharge later this morning.

## 2022-03-04 NOTE — PROGRESS NOTES
Removed IV. Went over AVS with Lianet Sparrow. Questions answered. He is calling his ride. He verbalized understanding in letting staff know when his ride is at the front main entrance and staff will take him to the main entrance via wheelchair.

## 2022-03-05 NOTE — DISCHARGE SUMMARY
[address] [place] [address], [address] [place] [address]                               DISCHARGE SUMMARY    PATIENT NAME: Kenny Matos                       :        1985  MED REC NO:   [id number]                          ROOM:       [id number]  ACCOUNT NO:   [de-identified]                           ADMIT DATE: 2022  PROVIDER:     Jose Quan MD                  DISCHARGE DATE:  2022    ADMITTING DIAGNOSIS:  Acute uncomplicated appendicitis with localized  peritonitis. DISCHARGE DIAGNOSIS:  Acute uncomplicated appendicitis with localized  peritonitis. PROCEDURE PERFORMED:  A laparoscopic appendectomy, which was 2022. CONSULTATIONS DURING THIS HOSPITALIZATION:  None. CONDITION ON DISCHARGE:  Surgically and medically stable. HOSPITAL COURSE:  As follows. The patient is a very pleasant  43-year-old  male, who presented with _____ history of  abdominal pain, which has migrated to the right lower quadrant. He then  presented to the hospital for further evaluation and CT of the abdomen  and pelvis, it did reveal acute uncomplicated appendicitis with the  presence of an appendicolith. He also had leukocytosis of almost 21,000  with associated nausea and vomiting. I then saw him and then proceeded  to recommend a laparoscopic appendectomy, which was performed. He was  then transferred to our surgical [facility] postoperatively in satisfactory  and stable condition. Overnight, he did well, tolerated his lipid diet,  pain was controlled with IV and oral medications. He had good urinary  output. He was ambulating in the halls and all of his trocar sites were  clean, dry, and intact. He did have some tenderness in the left lower  quadrant trocar site, which was secondary to my transabdominal _____ the  patient suture to close the fascial defect created by the trocar. His  diet will be advanced as tolerated.   He has already been on the IV  antibiotics ciprofloxacin and Flagyl and he had no perforations. Secondly, he will not be discharged on any oral antibiotics. He will  resume his home medications. I have given him a prescription for  Percocet. He will follow up in my office in about 10 days for suture  removal.  He will call me with any questions, concerns, or issues.         Romana Vivar MD    D: 03/04/2022 17:36:23       T: 03/04/2022 17:38:51     [email or web address]  Job#: [id number]     Doc#: [id number]    CC:

## 2022-03-14 PROBLEM — Z90.49 S/P LAPAROSCOPIC APPENDECTOMY: Status: ACTIVE | Noted: 2022-03-14

## 2024-01-22 NOTE — ED NOTES
Phone report called to pre op MANUEL.      Enriqueta Cordero RN  03/03/22 [id number] 3 = A little assistance

## (undated) DEVICE — STAPLER INT L34CM 60MM LNG ENDOSCP ARTC PWR + ECHELON FLX

## (undated) DEVICE — SUTURE ETHLN SZ 4-0 L18IN NONABSORBABLE BLK L19MM PS-2 3/8 1667H

## (undated) DEVICE — BLADE CLIPPER GEN PURP NS

## (undated) DEVICE — GOWN,ECLIPSE,POLYRNF,BRTHSLV,L,30/CS: Brand: MEDLINE

## (undated) DEVICE — NEEDLE HYPO 20GA L1.5IN YEL POLYPR HUB S STL REG BVL STR

## (undated) DEVICE — TROCAR: Brand: KII® SLEEVE

## (undated) DEVICE — SEALER/DIVIDER LAP SHFT L44CM JAW APER 11.4MM 315DEG ROT

## (undated) DEVICE — SUTURE ETHLN SZ 5-0 L18IN NONABSORBABLE BLK P-3 L13MM 3/8 698G

## (undated) DEVICE — TROCAR: Brand: KII FIOS FIRST ENTRY

## (undated) DEVICE — SYRINGE 20ML LL S/C 50

## (undated) DEVICE — APPLICATOR MEDICATED 26 CC SOLUTION HI LT ORNG CHLORAPREP

## (undated) DEVICE — SET TBNG DISP TIP FOR AHTO

## (undated) DEVICE — GLOVE ORANGE PI 7 1/2   MSG9075

## (undated) DEVICE — Device

## (undated) DEVICE — GLOVE SURG SZ 65 L12IN FNGR THK79MIL GRN LTX FREE

## (undated) DEVICE — TUBING INSUFFLATOR HEAT HI FLO SET PNEUMOCLEAR

## (undated) DEVICE — TISSUE RETRIEVAL SYSTEM: Brand: INZII RETRIEVAL SYSTEM

## (undated) DEVICE — RELOAD STPL L60MM H1.5-3.6MM REG TISS BLU GRIPPING SURF B

## (undated) DEVICE — TOWEL,OR,DSP,ST,BLUE,STD,6/PK,12PK/CS: Brand: MEDLINE

## (undated) DEVICE — DRESSING TRNSPAR W2XL2.75IN FLM SHT SEMIPERMEABLE WIND

## (undated) DEVICE — PACK SURG LAP CHOLE

## (undated) DEVICE — ELECTRODE ES AD CRDLSS PT RET REM POLYHESIVE

## (undated) DEVICE — SUTURE SZ 0 27IN 5/8 CIR UR-6  TAPER PT VIOLET ABSRB VICRYL J603H

## (undated) DEVICE — GLOVE SURG SZ 6 CRM LTX FREE POLYISOPRENE POLYMER BEAD ANTI